# Patient Record
Sex: FEMALE | Race: WHITE | NOT HISPANIC OR LATINO | Employment: FULL TIME | URBAN - NONMETROPOLITAN AREA
[De-identification: names, ages, dates, MRNs, and addresses within clinical notes are randomized per-mention and may not be internally consistent; named-entity substitution may affect disease eponyms.]

---

## 2018-07-24 ENCOUNTER — NON-PROVIDER VISIT (OUTPATIENT)
Dept: URGENT CARE | Facility: PHYSICIAN GROUP | Age: 48
End: 2018-07-24

## 2018-07-24 ENCOUNTER — APPOINTMENT (OUTPATIENT)
Dept: RADIOLOGY | Facility: IMAGING CENTER | Age: 48
End: 2018-07-24
Attending: NURSE PRACTITIONER
Payer: COMMERCIAL

## 2018-07-24 ENCOUNTER — OFFICE VISIT (OUTPATIENT)
Dept: MEDICAL GROUP | Facility: PHYSICIAN GROUP | Age: 48
End: 2018-07-24
Payer: COMMERCIAL

## 2018-07-24 VITALS
DIASTOLIC BLOOD PRESSURE: 90 MMHG | BODY MASS INDEX: 30.07 KG/M2 | HEIGHT: 69 IN | SYSTOLIC BLOOD PRESSURE: 136 MMHG | HEART RATE: 88 BPM | WEIGHT: 203 LBS | OXYGEN SATURATION: 97 % | TEMPERATURE: 97.9 F | RESPIRATION RATE: 20 BRPM

## 2018-07-24 DIAGNOSIS — T14.8XXA MUSCULOSKELETAL STRAIN: ICD-10-CM

## 2018-07-24 DIAGNOSIS — M54.41 ACUTE MIDLINE LOW BACK PAIN WITH RIGHT-SIDED SCIATICA: ICD-10-CM

## 2018-07-24 DIAGNOSIS — M54.41 ACUTE BACK PAIN WITH SCIATICA, RIGHT: ICD-10-CM

## 2018-07-24 PROCEDURE — 72100 X-RAY EXAM L-S SPINE 2/3 VWS: CPT | Mod: TC,FY | Performed by: NURSE PRACTITIONER

## 2018-07-24 PROCEDURE — 99214 OFFICE O/P EST MOD 30 MIN: CPT | Performed by: NURSE PRACTITIONER

## 2018-07-24 RX ORDER — TIZANIDINE 4 MG/1
4 TABLET ORAL 3 TIMES DAILY
Qty: 30 TAB | Refills: 0 | Status: SHIPPED | OUTPATIENT
Start: 2018-07-24

## 2018-07-24 RX ORDER — NAPROXEN 500 MG/1
500 TABLET ORAL 2 TIMES DAILY WITH MEALS
Qty: 60 TAB | Refills: 0 | Status: SHIPPED | OUTPATIENT
Start: 2018-07-24

## 2018-07-24 ASSESSMENT — PATIENT HEALTH QUESTIONNAIRE - PHQ9: CLINICAL INTERPRETATION OF PHQ2 SCORE: 0

## 2018-07-24 NOTE — PROGRESS NOTES
Chief Complaint   Patient presents with   • Back Injury     Saturday, pt was bending over and lifted a bag         This is a 48 y.o.female patient that presents today with the following: Low back pain    Acute midline low back pain with right-sided sciatica  Pt picked up heavy this past weekend, it was heavier than she thought and her back was fully extended. She felt immediate pain in her midline low back. She has had significant pain ever since. It is radiating into her R buttock and leg. There is no loss of bladder or bowel control and she denies saddle numbness. She does have history or low back surgery.  She has tried ice, heat, biofreeze and multiple other OTC remedies with no improvement.   Lumbar spine x-ray today. Will start Naproxen with muscle relaxer. Follow up with me in 2-3 weeks, sooner if needed.      No visits with results within 1 Month(s) from this visit.   Latest known visit with results is:   Hospital Outpatient Visit on 09/18/2015   Component Date Value   • WBC 09/18/2015 4.3*   • RBC 09/18/2015 4.67    • Hemoglobin 09/18/2015 13.9    • Hematocrit 09/18/2015 43.6    • MCV 09/18/2015 93.4    • MCH 09/18/2015 29.8    • MCHC 09/18/2015 31.9*   • RDW 09/18/2015 50.3*   • Platelet Count 09/18/2015 224    • MPV 09/18/2015 11.3    • Neutrophils-Polys 09/18/2015 58.70    • Lymphocytes 09/18/2015 28.90    • Monocytes 09/18/2015 8.50    • Eosinophils 09/18/2015 2.80    • Basophils 09/18/2015 0.90    • Immature Granulocytes 09/18/2015 0.20    • Nucleated RBC 09/18/2015 0.00    • Neutrophils (Absolute) 09/18/2015 2.54    • Lymphs (Absolute) 09/18/2015 1.25    • Monos (Absolute) 09/18/2015 0.37    • Eos (Absolute) 09/18/2015 0.12    • Baso (Absolute) 09/18/2015 0.04    • Immature Granulocytes (a* 09/18/2015 0.01    • NRBC (Absolute) 09/18/2015 0.00    • Vitamin B12 -True Cobala* 09/18/2015 430    • Pre-Albumin 09/18/2015 15.0*   • Folate -Folic Acid 09/18/2015 20.3    • Vitamin B1 09/18/2015 55*   •  "Ferritin 09/18/2015 18.9    • Cholesterol,Tot 09/18/2015 163    • Triglycerides 09/18/2015 90    • HDL 09/18/2015 43    • LDL 09/18/2015 102*   • 25-Hydroxy   Vitamin D 25 09/18/2015 19*   • Iron 09/18/2015 66    • Total Iron Binding 09/18/2015 405    • % Saturation 09/18/2015 16    • Transferrin 09/18/2015 292          clinical course has been stable    Past Medical History:   Diagnosis Date   • Anesthesia    • Cold     may 2015   • Dental disorder    • Hyperlipidemia    • Thyroid disease        Past Surgical History:   Procedure Laterality Date   • GASTRIC SLEEVE LAPAROSCOPY  6/15/2015    Procedure: GASTRIC SLEEVE LAPAROSCOPY;  Surgeon: John H Ganser, M.D.;  Location: SURGERY [facility];  Service:    • CHOLECYSTECTOMY     • LAMINOTOMY      rapaport       Family History   Problem Relation Age of Onset   • Diabetes Mother    • Hypertension Sister    • Heart Attack Maternal Grandmother    • Heart Attack Paternal Grandmother        Patient has no known allergies.    Current Outpatient Prescriptions Ordered in [facility]   Medication Sig Dispense Refill   • naproxen (NAPROSYN) 500 MG Tab Take 1 Tab by mouth 2 times a day, with meals. 60 Tab 0   • tizanidine (ZANAFLEX) 4 MG Tab Take 1 Tab by mouth 3 times a day. 30 Tab 0   • Multiple Vitamins-Minerals (ZINC PO) Take  by mouth.       No current Epic-ordered facility-administered medications on file.        Constitutional ROS: No unexpected change in weight, No weakness, No unexplained fevers, sweats, or chills  Pulmonary ROS: No chronic cough, sputum, or hemoptysis, No shortness of breath, No recent change in breathing  Cardiovascular ROS: No chest pain, No edema, No palpitations  Musculoskeletal/Extremities ROS: Positive per HPI  Neurologic ROS: Normal development, No seizures, No weakness    Physical exam:  /90   Pulse 88   Temp 36.6 °C (97.9 °F)   Resp 20   Ht 1.753 m (5' 9\")   Wt 92.1 kg (203 lb)   SpO2 97%   BMI 29.98 kg/m²   General Appearance: " Middle-aged female, alert, no distress, overweight, well-groomed  Skin: Skin color, texture, turgor normal. No rashes or lesions.  Back: positive findings: Moderate to intense pain with palpation to lumbar spine as well as sciatic notch on the right buttock  Lungs: negative findings: normal respiratory rate and rhythm, lungs clear to auscultation  Heart: negative. RRR without murmur, gallop, or rubs.  No ectopy.  Musculoskeletal: positive findings: Decreased range of motion to lumbar spine due to pain  Neurologic: intact, CN II through XII grossly intact    Medical decision making/discussion: Discussed with patient this is likely musculoskeletal strain and will get lumbar spine x-ray to rule out fracture dislocation, of note her x-ray was negative for dislocation or fracture.  She can continue with ice or heat whichever one provides more relief.  She was given a note for work.  I would like to see her back in 2-3 weeks, sooner if needed.  She was also advised to go to ER should she develop any red flag signs such as perineal numbness or loss of bladder and bowel control.    Mireya Vital was seen today for back injury.    Diagnoses and all orders for this visit:    Acute midline low back pain with right-sided sciatica  -     DX-LUMBAR SPINE-2 OR 3 VIEWS; Future  -     naproxen (NAPROSYN) 500 MG Tab; Take 1 Tab by mouth 2 times a day, with meals.  -     tizanidine (ZANAFLEX) 4 MG Tab; Take 1 Tab by mouth 3 times a day.    Musculoskeletal strain  -     naproxen (NAPROSYN) 500 MG Tab; Take 1 Tab by mouth 2 times a day, with meals.  -     tizanidine (ZANAFLEX) 4 MG Tab; Take 1 Tab by mouth 3 times a day.          Please note that this dictation was created using voice recognition software. I have made every reasonable attempt to correct obvious errors, but I expect that there are errors of grammar and possibly content that I did not discover before finalizing the note.

## 2018-07-24 NOTE — LETTER
July 24, 2018         Patient: Mireya Boucher   YOB: 1970   Date of Visit: 7/24/2018           To Whom it May Concern:    Mireya Boucher was seen in my clinic on 7/24/2018. She may return to work on 07/26/18.    If you have any questions or concerns, please don't hesitate to call.        Sincerely,           SHYANNE IsaacsRVAHID.  Electronically Signed

## 2018-07-24 NOTE — PATIENT INSTRUCTIONS
Naproxen with food, do not drive while taking muscle relaxer    X-ray today    See me again in 2-3 weeks, sooner if needed    Note for work

## 2018-07-24 NOTE — ASSESSMENT & PLAN NOTE
Pt picked up heavy this past weekend, it was heavier than she thought and her back was fully extended. She felt immediate pain in her midline low back. She has had significant pain ever since. It is radiating into her R buttock and leg. There is no loss of bladder or bowel control and she denies saddle numbness. She does have history or low back surgery.  She has tried ice, heat, biofreeze and multiple other OTC remedies with no improvement.   Lumbar spine x-ray today. Will start Naproxen with muscle relaxer. Follow up with me in 2-3 weeks, sooner if needed.

## 2018-08-07 ENCOUNTER — TELEPHONE (OUTPATIENT)
Dept: MEDICAL GROUP | Facility: PHYSICIAN GROUP | Age: 48
End: 2018-08-07

## 2018-08-07 NOTE — TELEPHONE ENCOUNTER
----- Message from NICK Isaacs sent at 7/24/2018  3:21 PM PDT -----  Please let patient know that x-ray does not show fracture, dislocation or soft tissue swelling.  This is likely a musculoskeletal strain as discussed at her appointment today.  If she does not improve with the medications and other interventions, she needs to follow-up for further evaluation.

## 2018-08-07 NOTE — TELEPHONE ENCOUNTER
Called [phone] ([place])  [facility] for patient to call [phone] to go over x ray results.

## 2020-07-27 ENCOUNTER — HOSPITAL ENCOUNTER (OUTPATIENT)
Dept: LAB | Facility: MEDICAL CENTER | Age: 50
End: 2020-07-27
Attending: OBSTETRICS & GYNECOLOGY
Payer: COMMERCIAL

## 2020-07-27 LAB
25(OH)D3 SERPL-MCNC: 30 NG/ML (ref 30–100)
ALBUMIN SERPL BCP-MCNC: 4.6 G/DL (ref 3.2–4.9)
ALBUMIN/GLOB SERPL: 1.8 G/DL
ALP SERPL-CCNC: 90 U/L (ref 30–99)
ALT SERPL-CCNC: 73 U/L (ref 2–50)
ANION GAP SERPL CALC-SCNC: 13 MMOL/L (ref 7–16)
AST SERPL-CCNC: 58 U/L (ref 12–45)
BASOPHILS # BLD AUTO: 1.2 % (ref 0–1.8)
BASOPHILS # BLD: 0.06 K/UL (ref 0–0.12)
BILIRUB SERPL-MCNC: 0.7 MG/DL (ref 0.1–1.5)
BUN SERPL-MCNC: 12 MG/DL (ref 8–22)
CALCIUM SERPL-MCNC: 9.6 MG/DL (ref 8.5–10.5)
CHLORIDE SERPL-SCNC: 102 MMOL/L (ref 96–112)
CHOLEST SERPL-MCNC: 189 MG/DL (ref 100–199)
CO2 SERPL-SCNC: 26 MMOL/L (ref 20–33)
CREAT SERPL-MCNC: 0.63 MG/DL (ref 0.5–1.4)
EOSINOPHIL # BLD AUTO: 0.26 K/UL (ref 0–0.51)
EOSINOPHIL NFR BLD: 5.3 % (ref 0–6.9)
ERYTHROCYTE [DISTWIDTH] IN BLOOD BY AUTOMATED COUNT: 45.7 FL (ref 35.9–50)
ESTRADIOL SERPL-MCNC: <5 PG/ML
FASTING STATUS PATIENT QL REPORTED: NORMAL
FSH SERPL-ACNC: 66.8 MIU/ML
GLOBULIN SER CALC-MCNC: 2.5 G/DL (ref 1.9–3.5)
GLUCOSE SERPL-MCNC: 81 MG/DL (ref 65–99)
HCT VFR BLD AUTO: 45 % (ref 37–47)
HDLC SERPL-MCNC: 67 MG/DL
HGB BLD-MCNC: 14.6 G/DL (ref 12–16)
IMM GRANULOCYTES # BLD AUTO: 0 K/UL (ref 0–0.11)
IMM GRANULOCYTES NFR BLD AUTO: 0 % (ref 0–0.9)
LDLC SERPL CALC-MCNC: 108 MG/DL
LYMPHOCYTES # BLD AUTO: 1.4 K/UL (ref 1–4.8)
LYMPHOCYTES NFR BLD: 28.5 % (ref 22–41)
MCH RBC QN AUTO: 30 PG (ref 27–33)
MCHC RBC AUTO-ENTMCNC: 32.4 G/DL (ref 33.6–35)
MCV RBC AUTO: 92.4 FL (ref 81.4–97.8)
MONOCYTES # BLD AUTO: 0.34 K/UL (ref 0–0.85)
MONOCYTES NFR BLD AUTO: 6.9 % (ref 0–13.4)
NEUTROPHILS # BLD AUTO: 2.85 K/UL (ref 2–7.15)
NEUTROPHILS NFR BLD: 58.1 % (ref 44–72)
NRBC # BLD AUTO: 0 K/UL
NRBC BLD-RTO: 0 /100 WBC
PLATELET # BLD AUTO: 261 K/UL (ref 164–446)
PMV BLD AUTO: 9.6 FL (ref 9–12.9)
POTASSIUM SERPL-SCNC: 3.8 MMOL/L (ref 3.6–5.5)
PROT SERPL-MCNC: 7.1 G/DL (ref 6–8.2)
RBC # BLD AUTO: 4.87 M/UL (ref 4.2–5.4)
SODIUM SERPL-SCNC: 141 MMOL/L (ref 135–145)
TRIGL SERPL-MCNC: 70 MG/DL (ref 0–149)
TSH SERPL DL<=0.005 MIU/L-ACNC: 2.83 UIU/ML (ref 0.38–5.33)
WBC # BLD AUTO: 4.9 K/UL (ref 4.8–10.8)

## 2020-07-27 PROCEDURE — 80053 COMPREHEN METABOLIC PANEL: CPT

## 2020-07-27 PROCEDURE — 82306 VITAMIN D 25 HYDROXY: CPT

## 2020-07-27 PROCEDURE — 36415 COLL VENOUS BLD VENIPUNCTURE: CPT

## 2020-07-27 PROCEDURE — 80061 LIPID PANEL: CPT

## 2020-07-27 PROCEDURE — 84443 ASSAY THYROID STIM HORMONE: CPT

## 2020-07-27 PROCEDURE — 82670 ASSAY OF TOTAL ESTRADIOL: CPT

## 2020-07-27 PROCEDURE — 83001 ASSAY OF GONADOTROPIN (FSH): CPT

## 2020-07-27 PROCEDURE — 85025 COMPLETE CBC W/AUTO DIFF WBC: CPT

## 2020-10-13 ENCOUNTER — HOSPITAL ENCOUNTER (OUTPATIENT)
Dept: LAB | Facility: MEDICAL CENTER | Age: 50
End: 2020-10-13
Attending: INTERNAL MEDICINE
Payer: COMMERCIAL

## 2020-10-13 LAB
ALBUMIN SERPL BCP-MCNC: 4.7 G/DL (ref 3.2–4.9)
ALP SERPL-CCNC: 65 U/L (ref 30–99)
ALT SERPL-CCNC: 17 U/L (ref 2–50)
AST SERPL-CCNC: 23 U/L (ref 12–45)
BILIRUB CONJ SERPL-MCNC: <0.2 MG/DL (ref 0.1–0.5)
BILIRUB INDIRECT SERPL-MCNC: NORMAL MG/DL (ref 0–1)
BILIRUB SERPL-MCNC: 0.6 MG/DL (ref 0.1–1.5)
FERRITIN SERPL-MCNC: 64.7 NG/ML (ref 10–291)
HBV SURFACE AB SERPL IA-ACNC: <3.5 MIU/ML (ref 0–10)
HBV SURFACE AG SER QL: NORMAL
HCV AB SER QL: NORMAL
IRON SATN MFR SERPL: 25 % (ref 15–55)
IRON SERPL-MCNC: 91 UG/DL (ref 40–170)
PROT SERPL-MCNC: 7.7 G/DL (ref 6–8.2)
TIBC SERPL-MCNC: 365 UG/DL (ref 250–450)
UIBC SERPL-MCNC: 274 UG/DL (ref 110–370)

## 2020-10-13 PROCEDURE — 86038 ANTINUCLEAR ANTIBODIES: CPT

## 2020-10-13 PROCEDURE — 87340 HEPATITIS B SURFACE AG IA: CPT

## 2020-10-13 PROCEDURE — 83516 IMMUNOASSAY NONANTIBODY: CPT

## 2020-10-13 PROCEDURE — 86803 HEPATITIS C AB TEST: CPT

## 2020-10-13 PROCEDURE — 82103 ALPHA-1-ANTITRYPSIN TOTAL: CPT

## 2020-10-13 PROCEDURE — 80076 HEPATIC FUNCTION PANEL: CPT

## 2020-10-13 PROCEDURE — 83540 ASSAY OF IRON: CPT

## 2020-10-13 PROCEDURE — 86706 HEP B SURFACE ANTIBODY: CPT

## 2020-10-13 PROCEDURE — 36415 COLL VENOUS BLD VENIPUNCTURE: CPT

## 2020-10-13 PROCEDURE — 82728 ASSAY OF FERRITIN: CPT

## 2020-10-13 PROCEDURE — 82784 ASSAY IGA/IGD/IGG/IGM EACH: CPT

## 2020-10-13 PROCEDURE — 83550 IRON BINDING TEST: CPT

## 2020-10-15 LAB
A1AT SERPL-MCNC: 130 MG/DL (ref 90–200)
IGA SERPL-MCNC: 205 MG/DL (ref 68–408)
MITOCHONDRIA M2 IGG SER-ACNC: 3.1 UNITS (ref 0–24.9)
NUCLEAR IGG SER QL IA: NORMAL
SMA IGG SER-ACNC: 5 UNITS (ref 0–19)

## 2020-10-16 LAB — TTG IGA SER IA-ACNC: <2 U/ML (ref 0–3)

## 2021-07-12 ENCOUNTER — HOSPITAL ENCOUNTER (OUTPATIENT)
Facility: MEDICAL CENTER | Age: 51
End: 2021-07-12
Attending: EMERGENCY MEDICINE
Payer: COMMERCIAL

## 2021-07-12 ENCOUNTER — OFFICE VISIT (OUTPATIENT)
Dept: URGENT CARE | Facility: CLINIC | Age: 51
End: 2021-07-12
Payer: COMMERCIAL

## 2021-07-12 VITALS
HEIGHT: 69 IN | DIASTOLIC BLOOD PRESSURE: 76 MMHG | SYSTOLIC BLOOD PRESSURE: 122 MMHG | RESPIRATION RATE: 18 BRPM | BODY MASS INDEX: 29.33 KG/M2 | HEART RATE: 86 BPM | TEMPERATURE: 98.7 F | WEIGHT: 198 LBS | OXYGEN SATURATION: 97 %

## 2021-07-12 DIAGNOSIS — R50.9 FEVER, UNSPECIFIED FEVER CAUSE: ICD-10-CM

## 2021-07-12 DIAGNOSIS — R53.83 FATIGUE, UNSPECIFIED TYPE: ICD-10-CM

## 2021-07-12 DIAGNOSIS — R82.90 ABNORMAL URINE: ICD-10-CM

## 2021-07-12 DIAGNOSIS — R51.9 NONINTRACTABLE HEADACHE, UNSPECIFIED CHRONICITY PATTERN, UNSPECIFIED HEADACHE TYPE: ICD-10-CM

## 2021-07-12 LAB
APPEARANCE UR: NORMAL
BILIRUB UR STRIP-MCNC: NORMAL MG/DL
COLOR UR AUTO: NORMAL
GLUCOSE UR STRIP.AUTO-MCNC: NORMAL MG/DL
KETONES UR STRIP.AUTO-MCNC: 15 MG/DL
LEUKOCYTE ESTERASE UR QL STRIP.AUTO: NORMAL
NITRITE UR QL STRIP.AUTO: NORMAL
PH UR STRIP.AUTO: 6 [PH] (ref 5–8)
PROT UR QL STRIP: 100 MG/DL
RBC UR QL AUTO: NORMAL
SP GR UR STRIP.AUTO: 1.01
UROBILINOGEN UR STRIP-MCNC: 2 MG/DL

## 2021-07-12 PROCEDURE — 81002 URINALYSIS NONAUTO W/O SCOPE: CPT | Performed by: EMERGENCY MEDICINE

## 2021-07-12 PROCEDURE — U0003 INFECTIOUS AGENT DETECTION BY NUCLEIC ACID (DNA OR RNA); SEVERE ACUTE RESPIRATORY SYNDROME CORONAVIRUS 2 (SARS-COV-2) (CORONAVIRUS DISEASE [COVID-19]), AMPLIFIED PROBE TECHNIQUE, MAKING USE OF HIGH THROUGHPUT TECHNOLOGIES AS DESCRIBED BY CMS-2020-01-R: HCPCS

## 2021-07-12 PROCEDURE — 87077 CULTURE AEROBIC IDENTIFY: CPT | Mod: 91

## 2021-07-12 PROCEDURE — 87186 SC STD MICRODIL/AGAR DIL: CPT

## 2021-07-12 PROCEDURE — 87086 URINE CULTURE/COLONY COUNT: CPT

## 2021-07-12 PROCEDURE — U0005 INFEC AGEN DETEC AMPLI PROBE: HCPCS

## 2021-07-12 PROCEDURE — 99204 OFFICE O/P NEW MOD 45 MIN: CPT | Performed by: EMERGENCY MEDICINE

## 2021-07-12 RX ORDER — ESTRADIOL 10 UG/1
INSERT VAGINAL
COMMUNITY
Start: 2021-06-09

## 2021-07-12 RX ORDER — SULFAMETHOXAZOLE AND TRIMETHOPRIM 800; 160 MG/1; MG/1
1 TABLET ORAL EVERY 12 HOURS
Qty: 28 TABLET | Refills: 0 | Status: SHIPPED | OUTPATIENT
Start: 2021-07-12 | End: 2021-07-26

## 2021-07-12 ASSESSMENT — FIBROSIS 4 INDEX: FIB4 SCORE: 1.09

## 2021-07-12 ASSESSMENT — ENCOUNTER SYMPTOMS
FATIGUE: 1
COUGH: 0
DIAPHORESIS: 1
ROS GI COMMENTS: SOME DECREASED APPETITE.
CHANGE IN BOWEL HABIT: 0
DIARRHEA: 0
HEADACHES: 1
NAUSEA: 0
MYALGIAS: 0
CHILLS: 1
ABDOMINAL PAIN: 0
FEVER: 1
VOMITING: 0
SORE THROAT: 0
ANOREXIA: 1

## 2021-07-12 NOTE — LETTER
July 12, 2021       Patient: Mireya Boucher   YOB: 1970   Date of Visit: 7/12/2021         To Whom It May Concern:    In my medical opinion, I recommend that Mireya Boucher not attend work July 12-13, 2021.  Sincerely,          London Gonzales M.D.  Electronically Signed

## 2021-07-12 NOTE — PROGRESS NOTES
Subjective:      Mireya Boucher is a 51 y.o. female who presents with Fatigue (Pt states she is experiencing Headache, Fatigue, and intermittent fever. Pt states Sx started about 4 days ago, Pt states she believes she may be experiencing heat exhaustion. )            Fatigue  This is a new problem. Episode onset: 4 days. The problem occurs daily. The problem has been waxing and waning. Associated symptoms include anorexia, chills, diaphoresis, fatigue, a fever and headaches. Pertinent negatives include no abdominal pain, change in bowel habit, chest pain, congestion, coughing, myalgias, nausea, rash, sore throat, urinary symptoms or vomiting. Exacerbated by: hot environment exposure. Treatments tried: Excedrin. The treatment provided mild relief.   No known insect bites.  No exposure to illness.  Onset associated with feeling unwell during exposure to hot environment.  Noted urine dark/orange afterwards, improved now..    PMH also UTI.    Review of Systems   Constitutional: Positive for chills, diaphoresis, fatigue and fever.   HENT: Negative for congestion and sore throat.    Respiratory: Negative for cough.    Cardiovascular: Negative for chest pain.   Gastrointestinal: Positive for anorexia. Negative for abdominal pain, change in bowel habit, diarrhea, nausea and vomiting.        Some decreased appetite.   Genitourinary: Negative for dysuria, frequency, hematuria and urgency.   Musculoskeletal: Negative for myalgias.   Skin: Negative for rash.   Neurological: Positive for headaches.       Past Medical History:   Diagnosis Date   • Anesthesia    • Cold     may 2015   • Dental disorder    • Hyperlipidemia    • Thyroid disease      Past Surgical History:   Procedure Laterality Date   • GASTRIC SLEEVE LAPAROSCOPY  6/15/2015    Procedure: GASTRIC SLEEVE LAPAROSCOPY;  Surgeon: John H Ganser, M.D.;  Location: SURGERY [facility];  Service:    • CHOLECYSTECTOMY     • LAMINOTOMY      rapaport      Allergy:  Patient  "has no known allergies.     Current Outpatient Medications:   •  naproxen, 500 mg, Oral, BID WITH MEALS  •  tizanidine, 4 mg, Oral, TID  •  Multiple Vitamins-Minerals (ZINC PO), Take  by mouth.   family history includes Diabetes in her mother; Heart Attack in her maternal grandmother and paternal grandmother; Hypertension in her sister.   Social History     Tobacco Use   • Smoking status: Never Smoker   • Smokeless tobacco: Never Used   Substance Use Topics   • Alcohol use: No     Comment: occasional   • Drug use: No       Objective:     /76   Pulse 86   Temp 37.1 °C (98.7 °F) (Temporal)   Resp 18   Ht 1.753 m (5' 9\")   Wt 89.8 kg (198 lb)   SpO2 97%   BMI 29.24 kg/m²      Physical Exam  Constitutional:       General: She is not in acute distress.     Appearance: Normal appearance. She is well-developed. She is not ill-appearing.   HENT:      Head: Normocephalic.      Right Ear: Hearing and external ear normal.      Left Ear: Hearing and external ear normal.      Nose: No rhinorrhea.      Mouth/Throat:      Mouth: Mucous membranes are moist.      Pharynx: Oropharynx is clear.   Eyes:      General: Lids are normal.      Extraocular Movements: Extraocular movements intact.      Conjunctiva/sclera: Conjunctivae normal.      Pupils: Pupils are equal, round, and reactive to light.   Neck:      Thyroid: No thyroid tenderness.      Trachea: Phonation normal.   Cardiovascular:      Rate and Rhythm: Normal rate and regular rhythm.  No extrasystoles are present.     Heart sounds: Normal heart sounds. No murmur heard.     Pulmonary:      Effort: Pulmonary effort is normal.      Breath sounds: Normal breath sounds.   Abdominal:      General: Bowel sounds are normal. There is no distension.      Palpations: Abdomen is soft.      Tenderness: There is no abdominal tenderness.   Musculoskeletal:      Cervical back: Neck supple.   Lymphadenopathy:      Cervical: No cervical adenopathy.   Skin:     General: Skin is warm " and dry.   Neurological:      Mental Status: She is alert.      Motor: No abnormal muscle tone.      Coordination: Coordination normal.   Psychiatric:         Behavior: Behavior is cooperative.                        Assessment/Plan:        1. Fever, unspecified fever cause  Positive ketone, protein, blood, nitrite, LE- POCT Urinalysis  - SARS-CoV-2 PCR (24 hour In-House): Collect NP swab in VTM; Future  Recommended supportive care measures, including rest, increasing oral fluid intake and use of over-the-counter medications for relief of symptoms.    2. Nonintractable headache, unspecified chronicity pattern, unspecified headache type  OTC analgesia prn    3. Fatigue, unspecified type    4. Abnormal urine  Suspect UTI  Urine culture sent  Rx Bactrim DS

## 2021-07-13 LAB
SARS-COV-2 RNA RESP QL NAA+PROBE: NOTDETECTED
SPECIMEN SOURCE: NORMAL

## 2021-07-16 LAB
BACTERIA UR CULT: ABNORMAL
SIGNIFICANT IND 70042: ABNORMAL
SITE SITE: ABNORMAL
SOURCE SOURCE: ABNORMAL

## 2023-09-20 ENCOUNTER — HOSPITAL ENCOUNTER (OUTPATIENT)
Dept: LAB | Facility: MEDICAL CENTER | Age: 53
End: 2023-09-20
Attending: OBSTETRICS & GYNECOLOGY
Payer: COMMERCIAL

## 2023-09-20 LAB
25(OH)D3 SERPL-MCNC: 24 NG/ML (ref 30–100)
ALBUMIN SERPL BCP-MCNC: 4.3 G/DL (ref 3.2–4.9)
ALBUMIN/GLOB SERPL: 1.7 G/DL
ALP SERPL-CCNC: 56 U/L (ref 30–99)
ALT SERPL-CCNC: 16 U/L (ref 2–50)
ANION GAP SERPL CALC-SCNC: 7 MMOL/L (ref 7–16)
AST SERPL-CCNC: 22 U/L (ref 12–45)
BASOPHILS # BLD AUTO: 0.7 % (ref 0–1.8)
BASOPHILS # BLD: 0.03 K/UL (ref 0–0.12)
BILIRUB SERPL-MCNC: 0.8 MG/DL (ref 0.1–1.5)
BUN SERPL-MCNC: 12 MG/DL (ref 8–22)
CALCIUM ALBUM COR SERPL-MCNC: 9.3 MG/DL (ref 8.5–10.5)
CALCIUM SERPL-MCNC: 9.5 MG/DL (ref 8.5–10.5)
CHLORIDE SERPL-SCNC: 103 MMOL/L (ref 96–112)
CHOLEST SERPL-MCNC: 182 MG/DL (ref 100–199)
CO2 SERPL-SCNC: 29 MMOL/L (ref 20–33)
CREAT SERPL-MCNC: 0.72 MG/DL (ref 0.5–1.4)
EOSINOPHIL # BLD AUTO: 0.22 K/UL (ref 0–0.51)
EOSINOPHIL NFR BLD: 5 % (ref 0–6.9)
ERYTHROCYTE [DISTWIDTH] IN BLOOD BY AUTOMATED COUNT: 45.9 FL (ref 35.9–50)
EST. AVERAGE GLUCOSE BLD GHB EST-MCNC: 108 MG/DL
FASTING STATUS PATIENT QL REPORTED: NORMAL
GFR SERPLBLD CREATININE-BSD FMLA CKD-EPI: 100 ML/MIN/1.73 M 2
GLOBULIN SER CALC-MCNC: 2.5 G/DL (ref 1.9–3.5)
GLUCOSE SERPL-MCNC: 74 MG/DL (ref 65–99)
HBA1C MFR BLD: 5.4 % (ref 4–5.6)
HCT VFR BLD AUTO: 45.5 % (ref 37–47)
HDLC SERPL-MCNC: 59 MG/DL
HGB BLD-MCNC: 14.3 G/DL (ref 12–16)
IMM GRANULOCYTES # BLD AUTO: 0.01 K/UL (ref 0–0.11)
IMM GRANULOCYTES NFR BLD AUTO: 0.2 % (ref 0–0.9)
LDLC SERPL CALC-MCNC: 107 MG/DL
LYMPHOCYTES # BLD AUTO: 1.38 K/UL (ref 1–4.8)
LYMPHOCYTES NFR BLD: 31.2 % (ref 22–41)
MCH RBC QN AUTO: 29.5 PG (ref 27–33)
MCHC RBC AUTO-ENTMCNC: 31.4 G/DL (ref 32.2–35.5)
MCV RBC AUTO: 94 FL (ref 81.4–97.8)
MONOCYTES # BLD AUTO: 0.32 K/UL (ref 0–0.85)
MONOCYTES NFR BLD AUTO: 7.2 % (ref 0–13.4)
NEUTROPHILS # BLD AUTO: 2.47 K/UL (ref 1.82–7.42)
NEUTROPHILS NFR BLD: 55.7 % (ref 44–72)
NRBC # BLD AUTO: 0 K/UL
NRBC BLD-RTO: 0 /100 WBC (ref 0–0.2)
PLATELET # BLD AUTO: 288 K/UL (ref 164–446)
PMV BLD AUTO: 9.7 FL (ref 9–12.9)
POTASSIUM SERPL-SCNC: 4.3 MMOL/L (ref 3.6–5.5)
PROT SERPL-MCNC: 6.8 G/DL (ref 6–8.2)
RBC # BLD AUTO: 4.84 M/UL (ref 4.2–5.4)
SODIUM SERPL-SCNC: 139 MMOL/L (ref 135–145)
TRIGL SERPL-MCNC: 80 MG/DL (ref 0–149)
TSH SERPL DL<=0.005 MIU/L-ACNC: 5.62 UIU/ML (ref 0.38–5.33)
WBC # BLD AUTO: 4.4 K/UL (ref 4.8–10.8)

## 2023-09-20 PROCEDURE — 85025 COMPLETE CBC W/AUTO DIFF WBC: CPT

## 2023-09-20 PROCEDURE — 80061 LIPID PANEL: CPT

## 2023-09-20 PROCEDURE — 80053 COMPREHEN METABOLIC PANEL: CPT

## 2023-09-20 PROCEDURE — 84443 ASSAY THYROID STIM HORMONE: CPT

## 2023-09-20 PROCEDURE — 36415 COLL VENOUS BLD VENIPUNCTURE: CPT

## 2023-09-20 PROCEDURE — 82306 VITAMIN D 25 HYDROXY: CPT

## 2023-09-20 PROCEDURE — 83036 HEMOGLOBIN GLYCOSYLATED A1C: CPT
